# Patient Record
Sex: FEMALE | URBAN - METROPOLITAN AREA
[De-identification: names, ages, dates, MRNs, and addresses within clinical notes are randomized per-mention and may not be internally consistent; named-entity substitution may affect disease eponyms.]

---

## 2022-09-13 ENCOUNTER — OFFICE VISIT (OUTPATIENT)
Dept: URBAN - METROPOLITAN AREA CLINIC 92 | Facility: CLINIC | Age: 27
End: 2022-09-13

## 2022-09-13 NOTE — HPI-TODAY'S VISIT:
Patient was referred by  ______ for an evaluation of hematemesis.  A copy of this note will be sent to the referring provider   Abd pain Location Quality Frequency Duration Radiation Severity Exacerbating factors Relieving factors   Assoc with   Denies abd pain N/V diarrhea constipation hematochezia melena jaundice unintentional wt loss   Denies dyspepsia htbrn dysphagia odynophagia food impaction CP cough anorexia light headedness   Denies scleral icterus, increased abd girth, LE edema, confusion, disorientation, memory loss  NSAID usage  EtOH / Tob  Fam Hx GI cancer  Prior EGD/colon, CT scan, RUQ US